# Patient Record
Sex: MALE | Race: ASIAN | NOT HISPANIC OR LATINO
[De-identification: names, ages, dates, MRNs, and addresses within clinical notes are randomized per-mention and may not be internally consistent; named-entity substitution may affect disease eponyms.]

---

## 2018-12-10 ENCOUNTER — APPOINTMENT (OUTPATIENT)
Dept: UROLOGY | Facility: CLINIC | Age: 43
End: 2018-12-10
Payer: COMMERCIAL

## 2018-12-10 VITALS
BODY MASS INDEX: 26.01 KG/M2 | WEIGHT: 192 LBS | HEART RATE: 67 BPM | RESPIRATION RATE: 17 BRPM | TEMPERATURE: 98.2 F | HEIGHT: 72 IN | DIASTOLIC BLOOD PRESSURE: 80 MMHG | SYSTOLIC BLOOD PRESSURE: 131 MMHG

## 2018-12-10 DIAGNOSIS — N52.9 MALE ERECTILE DYSFUNCTION, UNSPECIFIED: ICD-10-CM

## 2018-12-10 PROBLEM — Z00.00 ENCOUNTER FOR PREVENTIVE HEALTH EXAMINATION: Status: ACTIVE | Noted: 2018-12-10

## 2018-12-10 PROCEDURE — 99203 OFFICE O/P NEW LOW 30 MIN: CPT

## 2018-12-10 RX ORDER — SILDENAFIL 100 MG/1
100 TABLET, FILM COATED ORAL
Qty: 6 | Refills: 5 | Status: ACTIVE | COMMUNITY
Start: 2018-12-10 | End: 1900-01-01

## 2020-12-04 ENCOUNTER — APPOINTMENT (OUTPATIENT)
Dept: ORTHOPEDIC SURGERY | Facility: CLINIC | Age: 45
End: 2020-12-04
Payer: MEDICAID

## 2020-12-04 VITALS — BODY MASS INDEX: 27.36 KG/M2 | HEIGHT: 72 IN | WEIGHT: 202 LBS

## 2020-12-04 DIAGNOSIS — Z86.79 PERSONAL HISTORY OF OTHER DISEASES OF THE CIRCULATORY SYSTEM: ICD-10-CM

## 2020-12-04 DIAGNOSIS — M75.102 UNSPECIFIED ROTATOR CUFF TEAR OR RUPTURE OF LEFT SHOULDER, NOT SPECIFIED AS TRAUMATIC: ICD-10-CM

## 2020-12-04 DIAGNOSIS — Z78.9 OTHER SPECIFIED HEALTH STATUS: ICD-10-CM

## 2020-12-04 DIAGNOSIS — Z87.891 PERSONAL HISTORY OF NICOTINE DEPENDENCE: ICD-10-CM

## 2020-12-04 DIAGNOSIS — Z82.61 FAMILY HISTORY OF ARTHRITIS: ICD-10-CM

## 2020-12-04 DIAGNOSIS — Z86.69 PERSONAL HISTORY OF OTHER DISEASES OF THE NERVOUS SYSTEM AND SENSE ORGANS: ICD-10-CM

## 2020-12-04 DIAGNOSIS — Z80.9 FAMILY HISTORY OF MALIGNANT NEOPLASM, UNSPECIFIED: ICD-10-CM

## 2020-12-04 DIAGNOSIS — Z86.39 PERSONAL HISTORY OF OTHER ENDOCRINE, NUTRITIONAL AND METABOLIC DISEASE: ICD-10-CM

## 2020-12-04 DIAGNOSIS — M54.12 RADICULOPATHY, CERVICAL REGION: ICD-10-CM

## 2020-12-04 PROCEDURE — 99072 ADDL SUPL MATRL&STAF TM PHE: CPT

## 2020-12-04 PROCEDURE — 72040 X-RAY EXAM NECK SPINE 2-3 VW: CPT

## 2020-12-04 PROCEDURE — 73030 X-RAY EXAM OF SHOULDER: CPT | Mod: LT

## 2020-12-04 PROCEDURE — 99204 OFFICE O/P NEW MOD 45 MIN: CPT

## 2020-12-04 RX ORDER — DICLOFENAC SODIUM 50 MG/1
50 TABLET, DELAYED RELEASE ORAL TWICE DAILY
Qty: 60 | Refills: 0 | Status: ACTIVE | COMMUNITY
Start: 2020-12-04 | End: 1900-01-01

## 2020-12-04 NOTE — DISCUSSION/SUMMARY
[de-identified] : Left shoulder rotator cuff syndrome\par Cervical neural foraminal stenosis with possible compression of the exiting nerve roots C6-C7\par \par I discussed my findings on history, exam and radiology.\par \par I reviewed the anatomy and function of the shoulder rotator cuff muscles and tendons, biceps tendon and labrum. Given the current findings for the patient, I recommend proceeding with non-operative management of the shoulder consisting of the following:\par \par Patient education about the shoulder motions causing pain and possibly injury for activity modification\par \par Ice or warm compress\par \par Physical therapy prescription with shoulder rotator cuff strengthening, jono-scapular stabilization, ROM stretching, mobilization, modalities, HEP\par \par The patient was prescribed Diclofenac PO non-steroidal anti-inflammatory medication. 50mg tablets twice daily to be taken for at least 1-2 weeks in a row and then PRN afterwards. Risks and benefits were discussed and include but not limited to renal damage and GI ulceration and bleeding.  They were advised to take with food to limit stomach upset as well as warned to stop the medication if worsening gastric pain or dizziness or other side effects. Also to immediately stop the medication and seek appropriate medical attention if any severe stomach ache, gastritis, black/red vomit, black/red stools or any other medical concern.\par \par declining emg/ncv mri\par \par The patient verifies their understanding the the visit, diagnosis and plan. They agree with the treatment plan and will contact the office with any questions or problems.\par \par FU after PT completion if unimproved

## 2020-12-04 NOTE — PHYSICAL EXAM
[de-identified] : Physical Examination\par General: well nourished, in no acute distress, alert and oriented to person, place and time\par Psychiatric: normal mood and affect, no abnormal movements or speech patterns\par Eyes: vision intact + glasses\par Throat: no thyromegaly\par Lymph: no enlarged nodes, no lymphedema in extremity\par Respiratory: no wheezing, no shortness of breath with ambulation\par Cardiac: no cardiac leg swelling, 2+ peripheral pulses\par Neurology: normal gross sensation in extremities to light touch\par Abdomen: soft, non-tender, tympanic, no masses\par \par Musculoskeletal Examination\par Cervical spine	Full painless range of motion and mild positive left Spurling's test to left ulnar nerve\par \par Shoulder			Right			Left\par Appearance\par      Skin/Swelling/Deformity	normal			normal\par      Scapular Winging		-			-\par Range of Motion\par      Forward Flexion		170 / 170		170 / 170\par      Abduction			170 / 170		170 / 170\par      External Rotation		45			45\par      Internal Rotation		T10			T10\par      SAbd Ext Rotation		90			90\par      SAbd Int Rotation		80			80\par      Painful Arc			-			+\par      Crepitus			-			-\par Palpation\par      Clavicle			-			-\par      AC Joint			-			-\par      Posterior Acromion		-			-\par      Levator Scapula		-			-\par      Lateral Bursa			-			+\par      Impingement Area		-			-\par      Biceps Tendon		-			-\par      Anterior Capsule		-			-\par Strength Examination\par      Supraspinatous 		5+ / 0			5+ / 0\par      Infraspinatous			5+ / 0			5+ / 0\par      Subscapularis			5+ / 0			5+ / 0\par      Belly Press			5+ / 0			5+ / 0\par      Lift Off			-			-\par      Drop-Arm			-			-\par Special Examination\par      Biceps Haddonfield's		-			-\par      Impingement Neer		-			-\par      Impingement Hawking		-			-\par \par      Apprehension			-			-\par           Suppression Appre		-			-\par      Anterior Subluxation		-			-\par      Posterior Subluxation		-			-\par      AC Cross-Body\par           Anterior			-			-\par           Posterior			-			-\par \par Sensation\par      Axillary			normal			normal\par      LatAntCubBrach 		normal			normal\par      Median 			normal			normal\par      Ulnar 			normal			mild subj decrease\par      Radial 			normal			normal\par Motor\par      AIN 				normal			normal\par      Ulnar 			normal			normal\par      Radial 			normal			normal\par      PIN 				normal			normal\par Pulses\par      Radial			2+			2+ [de-identified] : 4 views of the affected Right shoulder (AP, Glenoid, Y-View, Axillary)\par And 2 views of the cervical spine\par were ordered, obtained and evaluated by myself today and\par demonstrate:\par normal bony calcification without dislocation and no fracture\par 	Arch	2B\par 	AC Joint	no Arthrosis\par 	GH Joint	no Arthrosis\par 	Calcifications	none\par \par Normal lordosis with maintained 3 column alignment with evidence of decreased cervical disc space and osteophytic changes at the C6-C7 level

## 2020-12-04 NOTE — HISTORY OF PRESENT ILLNESS
[de-identified] : CC  LEFT shoulder\par  \par HPI 45 year yo M right HD presents with chronic onset of acute on chronic, 5yrs on 3months, of intermittent pain in the lateral left shoulder without injury. The pain is same and rated a 2 out of 10, described as dull ache with radiation to elbow. Rest makes the pain better and golf makes the pain worse. The patient reports associated symptoms of numbess in the left pinky. The patient reports pain at night affecting sleep, denies neck pain, but reports neck stiffness, and reports similar pain previously.\par  \par The patient has tried the following treatments:\par Activity modification	+\par Ice			-\par Nsaids    		-\par Physical Therapy  	-\par Cortisone Injection	-\par Arthroscopy/Surgery	-\par \par  \par Review of Systems is positive for the above musculoskeletal symptoms and is otherwise non-contributory for general, constitutional, psychiatric, neurologic, HEENT, cardiac, respiratory, gastrointestinal, reproductive, lymphatic, and dermatologic complaints.\par  \par Consult by Carol

## 2022-08-24 ENCOUNTER — NON-APPOINTMENT (OUTPATIENT)
Age: 47
End: 2022-08-24

## 2022-10-03 ENCOUNTER — APPOINTMENT (OUTPATIENT)
Dept: ENDOCRINOLOGY | Facility: CLINIC | Age: 47
End: 2022-10-03

## 2022-10-03 VITALS
DIASTOLIC BLOOD PRESSURE: 92 MMHG | HEART RATE: 73 BPM | WEIGHT: 190 LBS | BODY MASS INDEX: 25.73 KG/M2 | OXYGEN SATURATION: 99 % | TEMPERATURE: 97.4 F | HEIGHT: 72 IN | SYSTOLIC BLOOD PRESSURE: 168 MMHG

## 2022-10-03 LAB
GLUCOSE BLDC GLUCOMTR-MCNC: 249
HBA1C MFR BLD HPLC: 9.6

## 2022-10-03 PROCEDURE — 99205 OFFICE O/P NEW HI 60 MIN: CPT | Mod: 25

## 2022-10-03 PROCEDURE — 82962 GLUCOSE BLOOD TEST: CPT

## 2022-10-03 NOTE — HISTORY OF PRESENT ILLNESS
[FreeTextEntry1] : CHIEF COMPLAINT: Type 2 diabetes, decreased libido\par \par REFERRED BY: Dr. Jacinto Olmedo\par \par Available prior medical records reviewed. \par \par HISTORY OF PRESENTING ILLNESS:\par The patient is a 47-year-old male being seen today for evaluation of type 2 diabetes, decreased libido.  He reports that he was diagnosed with diabetes in , started on metformin which she took for about 2 to 3 years.  He has stopped metformin since , and has not followed up with any doctors since then.  A1c in office today is 9.6%, 10/3/2022.  In terms of symptoms, he reports mild polyuria and polydipsia a couple months ago, which is resolved now.  Reports normal bowel movements, normal appetite.\par \par DIABETES HPI: \par Type of Diabetes: 2\par Duration of Diabetes: Diagnosed in \par \par A1c: 9.6% today, 10/3/2022\par \par Current home regimen: None, previously was on metformin until \par He is currently taking no medications\par \par Diabetes complications: He does not have any known complications of diabetes\par \par Last retinopathy screening: Due, last visit was many years ago\par Last nephropathy screening (urine ACR): Due, has not been to a doctor in a few years\par Last foot exam/podiatry visit: Due\par \par BG self monitoring: Fingersticks very occasionally, less than once a month\par Denies any hypoglycemia.  Reports that his morning blood sugars can be 130-190, and postmeal sugars can be up to 300s.  This is based on very infrequent checking.\par \par Diet: He reports that he has in a stage and diet, however has cut down on rice.  He reports that he eats a lot of veggies and meat, mostly beef.  He drinks black coffee without sugar, no sodas.\par Exercise: Minimal.  He plays golf once a week.\par Steroid intake: None\par \par Comorbidities: HTN, HLD\par Reports that he used to be on a statin and on blood pressure medications in the past, but does not remember.\par \par \par HYPOGONADISM HPI:\par Symptoms present since: He complains of decreased libido and erectile dysfunction since at least 2018\par Formal diagnosis of hypogonadism established: Never, testosterone has not been tested previously\par \par Symptoms: Tiredness, weakness, insomnia and daytime napping.  He is able to have erections, but finds it difficult to maintain erections, needs to concentrate more.  Ejaculation is okay.  He rarely has early morning erections.  He is not taking Viagra or Cialis currently.  He also endorses some postural lightheadedness.\par Decreased libido: Yes\par Erectile dysfunction: Yes\par Loss of muscle mass: He reports that he used to be a bigger built before\par Decreased shaving frequency: No change\par Decreased AM erections: Yes, he rarely has early a.m. erections\par Gynecomastia: No\par Anosmia: No\par History of fractures: No\par Fatigue: Yes\par \par Contributing factors: \par PEG: He reports that he snores at night, no formal sleep study\par Obesity: No\par Chronic pain: No\par Opiates: No\par Psychiatric diagnoses: No\par Psych medications: No\par Head injury: No\par Mumps: No\par Testicular injury: No\par Anabolic steroid use: No\par \par BPH Symptoms: He reports that he used to have polyuria and polydipsia a few months ago, but that is resolved now.\par \par Social factors: He is sexually active with his wife.  They have been  since .  He has 1 biological child, 6 years old.\par Social history: He drinks socially, 3 drinks a week.  He quit smoking in .  He owns a liquor store.\par Family history: Mother with diabetes, hypertension.  Father with diabetes, stroke, hypertension ().\par \par PERTINENT LABS: \par A1c 9.6%, today, 10/3/2022\par \par \par \par

## 2022-10-03 NOTE — ASSESSMENT
[FreeTextEntry1] : 1. Diabetes Mellitus \par Type: 2\par A1c: 9.6%, today, 10/3/2022\par Current Regimen: None\par Goal A1c: Less than 7%\par \par PLAN: \par We had a detailed discussion about his goal A1c, which would be less than 7%.  We discussed that starting a single agent such as metformin will not bring him down to his goal.  We should be aiming to start dual therapy in addition to lifestyle modifications.  We will check a CMP today, and if it looks normal, we can start the following regimen:\par \par - Regimen: (If normal CMP)\par      Metformin 1000 mg twice daily\par      Jardiance 10 mg daily\par \par We discussed benefits, side effects and risks of SGLT2-inhibitors including but not limited to risk of dehydration (increased thirst and polyuria), genital mycotic infections, rare risk of Kristen's gangrene and also of euglycemic DKA. Patient verbalized understanding of information.\par \par - Labs: CMP, urine ACR, fasting lipid profile, TSH, vitamin B12, 25 OH vitamin D, total and free testosterone, SHBG\par \par - Preventive: \par        Nephropathy screening: Due, check urine ACR\par        Retinopathy screening: Due, asked to make appointment\par        Foot exam/podiatrist: Due, will check at next visit\par        Diabetes education: Asked him to make an appointment with RD/CDE\par \par - Counseling: We discussed diabetes foot care, long term complications of diabetes including but not limited to neuropathy, nephropathy, retinopathy and cardiovascular disease and the benefits of good glycemic control in preventing said complications. We discussed the risks and benefits of diabetes medications and/or insulin as relevant for today, prevention and management of hypoglycemia, importance of medication compliance and blood glucose monitoring.\par \par 2. Hypertension\par BP goal <130/90\par Current medications: None\par He reports that he checks blood pressure at home, and is usually 160/100\par -If CMP shows normal renal function, will start lisinopril 5 mg daily\par \par 3. Hyperlipidemia\par LDL goal <100\par He should be on at least a moderate intensity statin given that he has type 2 diabetes and age 40 to 75 years.\par - We will start atorvastatin 20 mg daily after he completes labs\par \par 4.  Decreased libido\par –Optimize glycemic control as this should help with erectile dysfunction\par – Check total and free testosterone, fasting at 8 AM, check SHBG\par – Encourage lifestyle modifications such as improving sleep hygiene, dietary modification and increasing physical exercise\par –If work-up is normal, will offer Viagra or Cialis for erectile dysfunction\par \par Return to clinic in 3 months. \par \par Carie Aaron MD\par [facility]\par Endocrinology at [place] \par [address], [address]\par Ph: [phone]\par Fax: [phone]\par \par

## 2022-10-04 ENCOUNTER — TRANSCRIPTION ENCOUNTER (OUTPATIENT)
Age: 47
End: 2022-10-04

## 2022-10-05 LAB
25(OH)D3 SERPL-MCNC: 14.6 NG/ML
ALBUMIN SERPL ELPH-MCNC: 4.9 G/DL
ALP BLD-CCNC: 117 U/L
ALT SERPL-CCNC: 216 U/L
ANION GAP SERPL CALC-SCNC: 12 MMOL/L
AST SERPL-CCNC: 102 U/L
BILIRUB SERPL-MCNC: 0.6 MG/DL
BUN SERPL-MCNC: 19 MG/DL
CALCIUM SERPL-MCNC: 10 MG/DL
CHLORIDE SERPL-SCNC: 99 MMOL/L
CHOLEST SERPL-MCNC: 292 MG/DL
CO2 SERPL-SCNC: 28 MMOL/L
CREAT SERPL-MCNC: 0.95 MG/DL
CREAT SPEC-SCNC: 52 MG/DL
EGFR: 99 ML/MIN/1.73M2
GLUCOSE SERPL-MCNC: 175 MG/DL
HDLC SERPL-MCNC: 71 MG/DL
LDLC SERPL CALC-MCNC: 183 MG/DL
MICROALBUMIN 24H UR DL<=1MG/L-MCNC: 6.7 MG/DL
MICROALBUMIN/CREAT 24H UR-RTO: 128 MG/G
NONHDLC SERPL-MCNC: 221 MG/DL
POTASSIUM SERPL-SCNC: 4.4 MMOL/L
PROT SERPL-MCNC: 7.9 G/DL
SHBG SERPL-SCNC: 57.4 NMOL/L
SODIUM SERPL-SCNC: 139 MMOL/L
TRIGL SERPL-MCNC: 189 MG/DL
TSH SERPL-ACNC: 2.09 UIU/ML
VIT B12 SERPL-MCNC: 1218 PG/ML

## 2022-10-07 LAB
TESTOST FREE SERPL-MCNC: 9.3 PG/ML
TESTOST SERPL-MCNC: 697 NG/DL

## 2022-10-21 ENCOUNTER — APPOINTMENT (OUTPATIENT)
Dept: CARDIOLOGY | Facility: CLINIC | Age: 47
End: 2022-10-21

## 2022-10-21 VITALS
RESPIRATION RATE: 17 BRPM | SYSTOLIC BLOOD PRESSURE: 161 MMHG | OXYGEN SATURATION: 100 % | TEMPERATURE: 97.8 F | DIASTOLIC BLOOD PRESSURE: 98 MMHG | WEIGHT: 192 LBS | HEART RATE: 83 BPM | BODY MASS INDEX: 26.04 KG/M2

## 2022-10-21 DIAGNOSIS — Z82.49 FAMILY HISTORY OF ISCHEMIC HEART DISEASE AND OTHER DISEASES OF THE CIRCULATORY SYSTEM: ICD-10-CM

## 2022-10-21 DIAGNOSIS — Z78.9 OTHER SPECIFIED HEALTH STATUS: ICD-10-CM

## 2022-10-21 DIAGNOSIS — Z83.3 FAMILY HISTORY OF DIABETES MELLITUS: ICD-10-CM

## 2022-10-21 DIAGNOSIS — Z82.3 FAMILY HISTORY OF STROKE: ICD-10-CM

## 2022-10-21 PROCEDURE — 99203 OFFICE O/P NEW LOW 30 MIN: CPT

## 2022-10-31 PROBLEM — Z83.3 FAMILY HISTORY OF DIABETES MELLITUS: Status: ACTIVE | Noted: 2022-10-31

## 2022-10-31 PROBLEM — Z82.3 FAMILY HISTORY OF CEREBROVASCULAR ACCIDENT (CVA): Status: ACTIVE | Noted: 2022-10-31

## 2022-10-31 PROBLEM — Z82.49 FAMILY HISTORY OF HYPERTENSION: Status: ACTIVE | Noted: 2022-10-31

## 2022-10-31 PROBLEM — Z78.9 NON-SMOKER: Status: ACTIVE | Noted: 2022-10-31

## 2022-10-31 NOTE — HISTORY OF PRESENT ILLNESS
[FreeTextEntry1] : 47 year old male w. PMH of HTN, HLD, and DM presents to establish care. Pt hasn't seen PCP since 2018. He started Lisinopril 5 mg 3 weeks. His LDL on 10/13/22 was 163, but AST 57, . His A1C was 8.7. He denies CP/SOB/palpitations/dizziness/HA. Pt denies h/o syncope.\par

## 2022-12-12 ENCOUNTER — APPOINTMENT (OUTPATIENT)
Dept: ENDOCRINOLOGY | Facility: CLINIC | Age: 47
End: 2022-12-12

## 2022-12-12 ENCOUNTER — RESULT CHARGE (OUTPATIENT)
Age: 47
End: 2022-12-12

## 2022-12-12 VITALS — BODY MASS INDEX: 26.01 KG/M2 | WEIGHT: 192 LBS | HEIGHT: 72 IN

## 2022-12-12 LAB — GLUCOSE BLDC GLUCOMTR-MCNC: 129

## 2022-12-12 PROCEDURE — G0108 DIAB MANAGE TRN  PER INDIV: CPT

## 2023-01-09 ENCOUNTER — APPOINTMENT (OUTPATIENT)
Dept: ENDOCRINOLOGY | Facility: CLINIC | Age: 48
End: 2023-01-09
Payer: COMMERCIAL

## 2023-01-09 VITALS
DIASTOLIC BLOOD PRESSURE: 86 MMHG | SYSTOLIC BLOOD PRESSURE: 138 MMHG | HEIGHT: 72 IN | WEIGHT: 188 LBS | OXYGEN SATURATION: 97 % | HEART RATE: 69 BPM | BODY MASS INDEX: 25.47 KG/M2 | RESPIRATION RATE: 16 BRPM

## 2023-01-09 DIAGNOSIS — R68.82 DECREASED LIBIDO: ICD-10-CM

## 2023-01-09 LAB — HBA1C MFR BLD HPLC: 7.4

## 2023-01-09 PROCEDURE — 83036 HEMOGLOBIN GLYCOSYLATED A1C: CPT | Mod: QW

## 2023-01-09 PROCEDURE — 99215 OFFICE O/P EST HI 40 MIN: CPT | Mod: 25

## 2023-01-09 NOTE — HISTORY OF PRESENT ILLNESS
[FreeTextEntry1] : CHIEF COMPLAINT: Type 2 diabetes, decreased libido\par \par REFERRED BY: Dr. Jacinto Olmedo\par \par Available prior medical records reviewed. \par \par HISTORY OF PRESENTING ILLNESS:\par The patient is a 47-year-old male being seen today for evaluation of type 2 diabetes, decreased libido.  He was diagnosed with diabetes in , started on metformin which she took for about 2 to 3 years.  He has stopped metformin since , and did not follow up with any doctors until 10/2022 when he saw me.  A1c 10/3/22 9.6%. Reports normal bowel movements, normal appetite. Lost 5 lbs since LV. \par \par DIABETES HPI: \par Type of Diabetes: 2\par Duration of Diabetes: Diagnosed in \par \par A1c: 7.4% 23, 9.6% 10/3/2022\par \par Current home regimen: Metformin 1000 mg daily, Jardiance 10 mg daily (started 10/3/22)\par He mostly only takes Metformin 1000 once daily, as he is usually out in the evenings so doesn't take BID. \par Previously was on metformin until .\par \par Diabetes complications: He does not have any known complications of diabetes\par \par Last retinopathy screening: Due, last visit was many years ago\par Last nephropathy screening (urine ACR): +ve 128 in 10/2022\par Last foot exam/podiatry visit: Normal foot exam 23\par \par BG self monitoring: Fingersticks around once a week\par Denies any hypoglycemia.  -200s. Fasting usually low 100s, but can be up to 200 post prandial sometimes. \par \par Diet: Has cut down on rice.  He reports that he eats a lot of veggies and meat, mostly beef.  He drinks black coffee without sugar, no sodas.\par Exercise: Minimal.  He plays golf once a week.\par Steroid intake: None\par \par Comorbidities: \par HTN: taking lisinopril 15 mg daily now after seeing cardiologist\par HLD: not on statin.  We did not start statin because of elevated LFTs at last visit.  He reports that he saw his PCP who got a liver ultrasound, which showed fatty liver.  LFTs somewhat improved 10/13/2020 2 repeat check.\par \par \par HYPOGONADISM HPI:\par Symptoms present since: He complains of decreased libido and erectile dysfunction since at least 2018\par Normal testosterone 10/2022\par Symptoms: Tiredness, weakness, insomnia and daytime napping.  He is able to have erections, but finds it difficult to maintain erections, needs to concentrate more.  Ejaculation is okay.  He rarely has early morning erections.  He is not taking Viagra or Cialis currently.  He also endorses some postural lightheadedness.\par PEG: He reports that he snores at night, no formal sleep study\par \par Social factors: He is sexually active with his wife.  They have been  since .  He has 1 biological child, 6 years old.\par Social history: He drinks socially, 3 drinks a week.  He quit smoking in .  He owns a liquor store.\par Family history: Mother with diabetes, hypertension.  Father with diabetes, stroke, hypertension ().\par \par PERTINENT LABS: \par \par A1c 7.4% 2023\par \par 10/13/2022\par \par eGFR 94\par A1c 8.7%\par AST 57\par \par \par 10/3/2022\par A1c 9.6%\par Urine \par Total testosterone 697\par 25 OH vitamin D 14.6\par Vitamin B12 1218\par TSH 2.09\par Triglyceride 189\par Total cholesterol 292\par \par HDL 71\par eGFR 99\par \par \par \par \par \par

## 2023-01-09 NOTE — ASSESSMENT
[FreeTextEntry1] : 1. Diabetes Mellitus \par Type: 2\par A1c: 7.4% 1/9/23\par Current Regimen: Metformin 1000 mg daily, Jardiance 10 mg daily\par Goal A1c: Less than 7%\par \par PLAN: \par \par - Regimen:\par      Continue metformin 1000 mg twice daily, he has trouble taking it twice daily\par      Increase Jardiance to 25 mg daily\par \par We discussed benefits, side effects and risks of SGLT2-inhibitors including but not limited to risk of dehydration (increased thirst and polyuria), genital mycotic infections, rare risk of Kristen's gangrene and also of euglycemic DKA. Patient verbalized understanding of information.\par \par - Labs: CMP, urine ACR, fasting lipid profile, 25 OH vitamin D\par \par - Preventive: \par        Nephropathy screening: 10/2022, 128\par        Retinopathy screening: Due, asked to make appointment\par        Foot exam/podiatrist: Normal foot exam 1/9/2023\par        Diabetes education: Completed 12/2022\par \par - Counseling: We discussed diabetes foot care, long term complications of diabetes including but not limited to neuropathy, nephropathy, retinopathy and cardiovascular disease and the benefits of good glycemic control in preventing said complications. We discussed the risks and benefits of diabetes medications and/or insulin as relevant for today, prevention and management of hypoglycemia, importance of medication compliance and blood glucose monitoring.\par \par 2. Hypertension\par BP goal <130/90\par Current medications: Lisinopril 15 mg daily\par –Continue current medications and titration with cardiologist/PCP\par – Reporting some cough with lisinopril.  We discussed that we can try an ARB in the future if cough is very bothersome.\par \par 3. Hyperlipidemia\par LDL goal <100\par He should be on at least a moderate intensity statin given that he has type 2 diabetes and age 40 to 75 years.\par -Repeat CMP.  If LFTs improving/normal, will start low-dose statin.\par – He reports that liver ultrasound done with PCP showed fatty liver, records not available.\par \par 4.  Decreased libido\par Normal total testosterone from 10/2022.  Patient reports to me that symptoms are not very bothersome at this time.  We discussed taking Viagra or Cialis, but patient does not want it at this time.\par \par 5.  Vitamin D deficiency\par 25 OH vitamin D 14.6 from 10/2022.  He took vitamin D supplements for 1 month.\par – Repeat 25 OH vitamin D level\par \par Return to clinic in 4 months with NP, 8 months with me\par \par Carie Aaron MD\par [facility]\par Endocrinology at [place] \par [address], [address]\par Ph: [phone]\par Fax: [phone]\par \par

## 2023-01-10 ENCOUNTER — NON-APPOINTMENT (OUTPATIENT)
Age: 48
End: 2023-01-10

## 2023-01-10 LAB
25(OH)D3 SERPL-MCNC: 19.7 NG/ML
ALBUMIN SERPL ELPH-MCNC: 4.7 G/DL
ALP BLD-CCNC: 102 U/L
ALT SERPL-CCNC: 300 U/L
ANION GAP SERPL CALC-SCNC: 14 MMOL/L
AST SERPL-CCNC: 146 U/L
BILIRUB SERPL-MCNC: 0.7 MG/DL
BUN SERPL-MCNC: 23 MG/DL
CALCIUM SERPL-MCNC: 9.8 MG/DL
CHLORIDE SERPL-SCNC: 98 MMOL/L
CHOLEST SERPL-MCNC: 313 MG/DL
CO2 SERPL-SCNC: 27 MMOL/L
CREAT SERPL-MCNC: 1.04 MG/DL
CREAT SPEC-SCNC: 97 MG/DL
EGFR: 89 ML/MIN/1.73M2
GLUCOSE SERPL-MCNC: 139 MG/DL
HDLC SERPL-MCNC: 85 MG/DL
LDLC SERPL CALC-MCNC: 194 MG/DL
MICROALBUMIN 24H UR DL<=1MG/L-MCNC: 4.5 MG/DL
MICROALBUMIN/CREAT 24H UR-RTO: 46 MG/G
NONHDLC SERPL-MCNC: 228 MG/DL
POTASSIUM SERPL-SCNC: 4.1 MMOL/L
PROT SERPL-MCNC: 8.3 G/DL
SODIUM SERPL-SCNC: 138 MMOL/L
TRIGL SERPL-MCNC: 167 MG/DL

## 2023-05-09 ENCOUNTER — APPOINTMENT (OUTPATIENT)
Dept: ENDOCRINOLOGY | Facility: CLINIC | Age: 48
End: 2023-05-09

## 2023-08-10 ENCOUNTER — NON-APPOINTMENT (OUTPATIENT)
Age: 48
End: 2023-08-10

## 2023-08-13 ENCOUNTER — TRANSCRIPTION ENCOUNTER (OUTPATIENT)
Age: 48
End: 2023-08-13

## 2023-08-17 ENCOUNTER — APPOINTMENT (OUTPATIENT)
Dept: ORTHOPEDIC SURGERY | Facility: CLINIC | Age: 48
End: 2023-08-17
Payer: COMMERCIAL

## 2023-08-17 ENCOUNTER — NON-APPOINTMENT (OUTPATIENT)
Age: 48
End: 2023-08-17

## 2023-08-17 VITALS — WEIGHT: 188 LBS | HEIGHT: 72 IN | BODY MASS INDEX: 25.47 KG/M2

## 2023-08-17 DIAGNOSIS — M25.571 PAIN IN RIGHT ANKLE AND JOINTS OF RIGHT FOOT: ICD-10-CM

## 2023-08-17 DIAGNOSIS — M25.572 PAIN IN LEFT ANKLE AND JOINTS OF LEFT FOOT: ICD-10-CM

## 2023-08-17 PROCEDURE — 99213 OFFICE O/P EST LOW 20 MIN: CPT

## 2023-08-17 PROCEDURE — 73610 X-RAY EXAM OF ANKLE: CPT | Mod: LT

## 2023-08-17 NOTE — DISCUSSION/SUMMARY
[de-identified] : 46 y/o male with bilateral ankle pain.   The patient presents with pain to the bilateral ankles. Presentation is consistent with early degenerative change to the lateral ankle from prior instability events. Described that this is likely due to overuse, and age-related "wear and tear". Pain likely related to breakdown of the lateral ligamentous complex with some residual instability.  Recommendation: Conservative care & observation, NSAIDS, ice prn. Bracing as needed.   Follow-up as needed.

## 2023-08-17 NOTE — HISTORY OF PRESENT ILLNESS
[de-identified] : 47 year old male presents today with chronic bilateral ankle pain. R>L. Recalls injuring his ankles in high school and both ankles feel unstable. The pain is present with weather changes. Localilzes pain to the lateral ankle. He is not taking pain medication. He is here for check up. Denies numbness or tingling.    The patient's past medical history, past surgical history, medications and allergies were reviewed by me today with the patient and documented accordingly. In addition, the patient's family and social history, which were noncontributory to this visit, were reviewed also.

## 2023-08-17 NOTE — PHYSICAL EXAM
[de-identified] : Oriented to time, place, person Mood: Normal Affect: Normal Appearance: Healthy, well appearing, no acute distress. Gait: Normal Assistive Devices: None  Right Ankle exam:  Skin: Clean, dry, intact Inspection: No obvious malalignment, no swelling, no effusion Pulses: 2+ DP/PT pulses  ROM: normal degrees of dorsiflexion, normal degrees of plantarflexion, normal subtalar motion. Tenderness: No tenderness over the lateral malleolus, no CFL/ATFL/PTFL pain. No medial malleolus pain, no deltoid ligament pain. No proximal fibular pain. No heel pain. Stability: Negative anterior drawer, negative posterior drawer. Mild valgus laxity to the ankle.  Strength: 5/5 TA/GS/EHL 5/5 inversion/eversion Neuro: In tact to light touch throughout Additional tests: Negative syndesmosis squeeze test.  Left Ankle exam:  Skin: Clean, dry, intact Inspection: No obvious malalignment, no swelling, no effusion Pulses: 2+ DP/PT pulses  ROM: normal degrees of dorsiflexion, normal degrees of plantarflexion, normal subtalar motion. Tenderness: No tenderness over the lateral malleolus, no CFL/ATFL/PTFL pain. No medial malleolus pain, no deltoid ligament pain. No proximal fibular pain. No heel pain. Stability: Negative anterior drawer, negative posterior drawer. Strength: 5/5 TA/GS/EHL 5/5 inversion/eversion Neuro: In tact to light touch throughout Additional tests: Negative syndesmosis squeeze test. [de-identified] : The following radiographs were ordered and read by me during this patients visit. I reviewed each radiograph in detail with the patient and discussed the findings as highlighted below.   3 views of the bilateral ankle were obtained today, 08/17/2023, that show no acute fracture or dislocation. There is mild degenerative change seen at the lateral ankle bilaterally. There is no gross malalignment. Ankle mortise is intact.

## 2023-08-17 NOTE — ADDENDUM
[FreeTextEntry1] : This note was written by Digna Hernandez on 08/17/2023 acting solely as a scribe for Dr. Charlie Rios.  All medical record entries made by the Scribe were at my, Dr. Charlie Rios, direction and personally dictated by me on 08/17/2023. I have personally reviewed the chart and agree that the record accurately reflects my personal performance of the history, physical exam, assessment and plan.

## 2023-09-25 ENCOUNTER — RX RENEWAL (OUTPATIENT)
Age: 48
End: 2023-09-25

## 2023-09-25 RX ORDER — METFORMIN ER 500 MG 500 MG/1
500 TABLET ORAL
Qty: 360 | Refills: 3 | Status: ACTIVE | COMMUNITY
Start: 2022-10-05 | End: 1900-01-01

## 2023-09-25 RX ORDER — EMPAGLIFLOZIN 25 MG/1
25 TABLET, FILM COATED ORAL
Qty: 90 | Refills: 3 | Status: ACTIVE | COMMUNITY
Start: 2023-09-25 | End: 1900-01-01

## 2023-10-02 ENCOUNTER — APPOINTMENT (OUTPATIENT)
Dept: ENDOCRINOLOGY | Facility: CLINIC | Age: 48
End: 2023-10-02
Payer: COMMERCIAL

## 2023-10-02 VITALS
HEART RATE: 69 BPM | BODY MASS INDEX: 25.47 KG/M2 | HEIGHT: 72 IN | DIASTOLIC BLOOD PRESSURE: 80 MMHG | SYSTOLIC BLOOD PRESSURE: 130 MMHG | WEIGHT: 188 LBS | OXYGEN SATURATION: 98 %

## 2023-10-02 LAB
25(OH)D3 SERPL-MCNC: 18.8 NG/ML
ALBUMIN SERPL ELPH-MCNC: 5.1 G/DL
ALP BLD-CCNC: 69 U/L
ALT SERPL-CCNC: 49 U/L
ANION GAP SERPL CALC-SCNC: 12 MMOL/L
AST SERPL-CCNC: 31 U/L
BILIRUB SERPL-MCNC: 0.8 MG/DL
BUN SERPL-MCNC: 27 MG/DL
CALCIUM SERPL-MCNC: 10.2 MG/DL
CHLORIDE SERPL-SCNC: 101 MMOL/L
CHOLEST SERPL-MCNC: 259 MG/DL
CO2 SERPL-SCNC: 27 MMOL/L
CREAT SERPL-MCNC: 1.11 MG/DL
CREAT SPEC-SCNC: 100 MG/DL
EGFR: 81 ML/MIN/1.73M2
GLUCOSE SERPL-MCNC: 123 MG/DL
HBA1C MFR BLD HPLC: 6.5
HDLC SERPL-MCNC: 85 MG/DL
LDLC SERPL CALC-MCNC: 153 MG/DL
MICROALBUMIN 24H UR DL<=1MG/L-MCNC: 1.6 MG/DL
MICROALBUMIN/CREAT 24H UR-RTO: 16 MG/G
NONHDLC SERPL-MCNC: 174 MG/DL
POTASSIUM SERPL-SCNC: 4.9 MMOL/L
PROT SERPL-MCNC: 8.1 G/DL
SODIUM SERPL-SCNC: 141 MMOL/L
TRIGL SERPL-MCNC: 120 MG/DL
TSH SERPL-ACNC: 1.25 UIU/ML
VIT B12 SERPL-MCNC: 761 PG/ML

## 2023-10-02 PROCEDURE — 99214 OFFICE O/P EST MOD 30 MIN: CPT | Mod: 25

## 2023-10-02 PROCEDURE — 83036 HEMOGLOBIN GLYCOSYLATED A1C: CPT | Mod: QW

## 2023-10-02 RX ORDER — EMPAGLIFLOZIN 25 MG/1
25 TABLET, FILM COATED ORAL
Qty: 90 | Refills: 3 | Status: ACTIVE | COMMUNITY
Start: 2022-10-05 | End: 1900-01-01

## 2023-12-09 ENCOUNTER — RX RENEWAL (OUTPATIENT)
Age: 48
End: 2023-12-09

## 2024-04-11 ENCOUNTER — APPOINTMENT (OUTPATIENT)
Dept: ENDOCRINOLOGY | Facility: CLINIC | Age: 49
End: 2024-04-11
Payer: COMMERCIAL

## 2024-04-11 VITALS
HEART RATE: 80 BPM | OXYGEN SATURATION: 97 % | SYSTOLIC BLOOD PRESSURE: 120 MMHG | DIASTOLIC BLOOD PRESSURE: 80 MMHG | WEIGHT: 188 LBS | HEIGHT: 72 IN | BODY MASS INDEX: 25.47 KG/M2

## 2024-04-11 DIAGNOSIS — I10 ESSENTIAL (PRIMARY) HYPERTENSION: ICD-10-CM

## 2024-04-11 DIAGNOSIS — E11.9 TYPE 2 DIABETES MELLITUS W/OUT COMPLICATIONS: ICD-10-CM

## 2024-04-11 DIAGNOSIS — E55.9 VITAMIN D DEFICIENCY, UNSPECIFIED: ICD-10-CM

## 2024-04-11 DIAGNOSIS — N52.9 MALE ERECTILE DYSFUNCTION, UNSPECIFIED: ICD-10-CM

## 2024-04-11 DIAGNOSIS — E78.5 HYPERLIPIDEMIA, UNSPECIFIED: ICD-10-CM

## 2024-04-11 LAB — HBA1C MFR BLD HPLC: 8

## 2024-04-11 PROCEDURE — 99214 OFFICE O/P EST MOD 30 MIN: CPT

## 2024-04-11 PROCEDURE — 83036 HEMOGLOBIN GLYCOSYLATED A1C: CPT | Mod: QW

## 2024-04-11 PROCEDURE — G2211 COMPLEX E/M VISIT ADD ON: CPT

## 2024-04-11 RX ORDER — SILDENAFIL 50 MG/1
50 TABLET ORAL
Qty: 40 | Refills: 1 | Status: ACTIVE | COMMUNITY
Start: 2024-04-11 | End: 1900-01-01

## 2024-04-11 NOTE — HISTORY OF PRESENT ILLNESS
[FreeTextEntry1] : CHIEF COMPLAINT: Type 2 diabetes  HISTORY OF PRESENTING ILLNESS: The patient is a 48-year-old male being seen today for evaluation of type 2 diabetes.  He was diagnosed with diabetes in , started on metformin which he took for about 2 to 3 years.  He stopped metformin in , and did not follow up with any doctors until 10/2022 when he first saw me.  A1c 10/3/22 9.6%, when we restarted metformin.  DIABETES HPI:  Type of Diabetes: 2 Duration of Diabetes: Diagnosed in   A1c: 8% 2024 -> reports worsening of A1c due to dietary indiscretions in the last few months 6.5% 10/2/2023 7.4% 23, 9.6% 10/3/2022  Current regimen: Metformin 1000 mg daily, Jardiance 25 mg daily Diabetes complications: He does not have any known complications of diabetes.  Last retinopathy screening: Due, last visit was many years ago Last nephropathy screening (urine ACR): +ve 46 in 2023, normalized in 10/2023  BG self monitoring: Not checking Denies any hypoglycemia.    Diet: Trying to cut down on carbs.  Last few months he has been eating poorly.  Exercise: Minimal.  He plays golf once a week.  Comorbidities:  HTN: taking lisinopril 15 mg daily, follows with cardiologist.  Reports that he takes 10 mg and 20 mg on alternate days. HLD: Started rosuvastatin 5 mg daily in 10/2023 Erectile dysfunction: Normal testosterone levels from .  He has normal libido, normal erection but has difficulty maintaining the erections.  Sexually active around twice a week.  Social history: He drinks socially, 3 drinks a week.  He quit smoking in .  He owns a liquor store. Family history: Mother with diabetes, hypertension.  Father with diabetes, stroke, hypertension ().

## 2024-04-11 NOTE — ASSESSMENT
[FreeTextEntry1] : 1. Diabetes Mellitus  Type: 2 A1c: 8% 4/11/24 Current Regimen: Metformin 1000 mg daily, Jardiance 25 mg daily  PLAN:   - Regimen:      Increase metformin to 1000 mg twice daily      Continue Jardiance 25 mg daily We discussed making lifestyle modifications such as dietary modifications and increasing physical exercise.  - Labs: CMP, lipid panel, 25 OH vitamin D  - Preventive:         Nephropathy screening: Negative, 10/2023        Retinopathy screening: Due, asked to make appointment        Foot exam/podiatrist: Normal foot exam 1/9/2023        Diabetes education: Completed 12/2022  2.  Erectile dysfunction Difficulty maintaining erections.  Normal testosterone from 2022.  Normal libido. -Recommend trying Viagra 50 to 100 mg, take 30 to 60 minutes before sexual intercourse.  Side effects discussed.  3. Hyperlipidemia LDL goal <100 Rosuvastatin 5 mg started in 10/2023. -Continue rosuvastatin 5 mg daily, monitor LFTs -Check lipid panel, CMP - He reports that liver ultrasound done with PCP showed fatty liver, records not available.  Mildly elevated LFTs.  4.  Vitamin D deficiency -Continue over-the-counter supplements - Repeat 25 OH vitamin D level  Return to clinic in 4 months.  Carie Aaron MD [facility] at [address], [address] Ph: [phone] Fax: [phone]

## 2024-04-12 LAB
25(OH)D3 SERPL-MCNC: 31.2 NG/ML
ALBUMIN SERPL ELPH-MCNC: 5 G/DL
ALP BLD-CCNC: 72 U/L
ALT SERPL-CCNC: 106 U/L
ANION GAP SERPL CALC-SCNC: 13 MMOL/L
ANION GAP SERPL CALC-SCNC: 13 MMOL/L
AST SERPL-CCNC: 45 U/L
BILIRUB SERPL-MCNC: 0.6 MG/DL
BUN SERPL-MCNC: 26 MG/DL
BUN SERPL-MCNC: 26 MG/DL
CALCIUM SERPL-MCNC: 10 MG/DL
CALCIUM SERPL-MCNC: 10.1 MG/DL
CHLORIDE SERPL-SCNC: 101 MMOL/L
CHLORIDE SERPL-SCNC: 102 MMOL/L
CHOLEST SERPL-MCNC: 179 MG/DL
CO2 SERPL-SCNC: 24 MMOL/L
CO2 SERPL-SCNC: 25 MMOL/L
CREAT SERPL-MCNC: 1.19 MG/DL
CREAT SERPL-MCNC: 1.21 MG/DL
EGFR: 73 ML/MIN/1.73M2
EGFR: 75 ML/MIN/1.73M2
GLUCOSE SERPL-MCNC: 161 MG/DL
GLUCOSE SERPL-MCNC: 163 MG/DL
HDLC SERPL-MCNC: 82 MG/DL
LDLC SERPL CALC-MCNC: 78 MG/DL
NONHDLC SERPL-MCNC: 97 MG/DL
POTASSIUM SERPL-SCNC: 4.5 MMOL/L
POTASSIUM SERPL-SCNC: 4.6 MMOL/L
PROT SERPL-MCNC: 8.5 G/DL
SODIUM SERPL-SCNC: 138 MMOL/L
SODIUM SERPL-SCNC: 139 MMOL/L
TRIGL SERPL-MCNC: 113 MG/DL

## 2024-05-26 ENCOUNTER — RX RENEWAL (OUTPATIENT)
Age: 49
End: 2024-05-26

## 2024-05-26 RX ORDER — LISINOPRIL 10 MG/1
10 TABLET ORAL DAILY
Qty: 90 | Refills: 0 | Status: ACTIVE | COMMUNITY
Start: 2022-10-05 | End: 1900-01-01

## 2024-06-21 ENCOUNTER — RX RENEWAL (OUTPATIENT)
Age: 49
End: 2024-06-21

## 2024-06-21 RX ORDER — ROSUVASTATIN CALCIUM 5 MG/1
5 TABLET, FILM COATED ORAL
Qty: 90 | Refills: 2 | Status: ACTIVE | COMMUNITY
Start: 2023-10-03 | End: 1900-01-01

## 2024-08-22 ENCOUNTER — APPOINTMENT (OUTPATIENT)
Dept: ENDOCRINOLOGY | Facility: CLINIC | Age: 49
End: 2024-08-22
Payer: COMMERCIAL

## 2024-08-22 VITALS
BODY MASS INDEX: 25.33 KG/M2 | HEART RATE: 77 BPM | HEIGHT: 72 IN | SYSTOLIC BLOOD PRESSURE: 120 MMHG | WEIGHT: 187 LBS | DIASTOLIC BLOOD PRESSURE: 82 MMHG | OXYGEN SATURATION: 99 %

## 2024-08-22 DIAGNOSIS — E11.9 TYPE 2 DIABETES MELLITUS W/OUT COMPLICATIONS: ICD-10-CM

## 2024-08-22 DIAGNOSIS — N52.9 MALE ERECTILE DYSFUNCTION, UNSPECIFIED: ICD-10-CM

## 2024-08-22 DIAGNOSIS — E78.5 HYPERLIPIDEMIA, UNSPECIFIED: ICD-10-CM

## 2024-08-22 DIAGNOSIS — R68.82 DECREASED LIBIDO: ICD-10-CM

## 2024-08-22 LAB — HBA1C MFR BLD HPLC: 7.3

## 2024-08-22 PROCEDURE — 99214 OFFICE O/P EST MOD 30 MIN: CPT

## 2024-08-22 PROCEDURE — G2211 COMPLEX E/M VISIT ADD ON: CPT

## 2024-08-22 PROCEDURE — 83036 HEMOGLOBIN GLYCOSYLATED A1C: CPT | Mod: QW

## 2024-08-22 NOTE — HISTORY OF PRESENT ILLNESS
[FreeTextEntry1] : CHIEF COMPLAINT: Type 2 diabetes  HISTORY OF PRESENTING ILLNESS: The patient is a 48-year-old male being seen today for evaluation of type 2 diabetes.  He was diagnosed with diabetes in , started on metformin which he took for about 2 to 3 years.  He stopped metformin in , and did not follow up with any doctors until 10/2022 when he first saw me.  A1c 10/3/22 9.6%, when we restarted metformin.  DIABETES HPI:  Type of Diabetes: 2 Duration of Diabetes: Diagnosed in   A1c: 7.3% 24 8% 2024 -> worsening of A1c due to dietary indiscretions in the last few months 6.5% 10/2/2023 7.4% 23, 9.6% 10/3/2022  Current regimen: Metformin 1000 mg twice daily (skips evening dose around 2x/week), Jardiance 25 mg daily Diabetes complications: He does not have any known complications of diabetes.  Last retinopathy screening: Due, last visit was many years ago Last nephropathy screening (urine ACR): +ve 46 in 2023, normalized in 10/2023  BG self monitoring: Not checking Denies any hypoglycemia.    Diet: Trying to cut down on carbs.  Last few months he has been eating poorly.  Exercise: Minimal.  He plays golf once a week.  Comorbidities:  HTN: taking lisinopril 15 mg daily, follows with cardiologist.  Reports that he takes 10 mg and 20 mg on alternate days. HLD: Started rosuvastatin 5 mg daily in 10/2023 Erectile dysfunction: Normal testosterone levels from .  He has normal libido, normal erection but has difficulty maintaining the erections.  Uses Viagra as needed with benefit.   Social history: He drinks socially, 3 drinks a week.  He quit smoking in .  He owns a liquor store. Family history: Mother with diabetes, hypertension.  Father with diabetes, stroke, hypertension ().

## 2024-08-22 NOTE — ASSESSMENT
[FreeTextEntry1] : 1. Diabetes Mellitus  Type: 2 A1c: 7.3% 8/22/24 Current Regimen: Metformin 1000 mg twice daily (skips evening dose around 2x/week), Jardiance 25 mg daily  PLAN:   - Regimen:      Continue metformin 1000 mg twice daily, encouraged compliance      Continue Jardiance 25 mg daily We discussed making lifestyle modifications such as dietary modifications and increasing physical exercise.  - Labs: CMP, Urine ACR  - Preventive:         Nephropathy screening: Negative, 10/2023        Retinopathy screening: Due, asked to make appointment        Diabetes education: Completed 12/2022  2.  Erectile dysfunction Difficulty maintaining erections.  Normal testosterone from 2022.  Normal libido. -Continue Viagra as needed.  Side effects discussed.  3. Hyperlipidemia LDL goal <100 Rosuvastatin 5 mg started in 10/2023. -Continue rosuvastatin 5 mg daily, monitor LFTs - He reports that liver ultrasound done with PCP showed fatty liver, records not available.  Mildly elevated LFTs.  Return to clinic in 4-6 months.  Carie Aaron MD [facility] at [address], [address] Ph: [phone] Fax: [phone]

## 2024-08-23 LAB
ALBUMIN SERPL ELPH-MCNC: 4.5 G/DL
ALP BLD-CCNC: 75 U/L
ALT SERPL-CCNC: 182 U/L
ANION GAP SERPL CALC-SCNC: 12 MMOL/L
AST SERPL-CCNC: 75 U/L
BILIRUB SERPL-MCNC: 0.8 MG/DL
BUN SERPL-MCNC: 24 MG/DL
CALCIUM SERPL-MCNC: 9.5 MG/DL
CHLORIDE SERPL-SCNC: 101 MMOL/L
CO2 SERPL-SCNC: 26 MMOL/L
CREAT SERPL-MCNC: 1.06 MG/DL
CREAT SPEC-SCNC: 108 MG/DL
EGFR: 86 ML/MIN/1.73M2
GLUCOSE SERPL-MCNC: 114 MG/DL
MICROALBUMIN 24H UR DL<=1MG/L-MCNC: <1.2 MG/DL
MICROALBUMIN/CREAT 24H UR-RTO: NORMAL MG/G
POTASSIUM SERPL-SCNC: 4.9 MMOL/L
PROT SERPL-MCNC: 7.7 G/DL
SODIUM SERPL-SCNC: 139 MMOL/L

## 2024-09-16 ENCOUNTER — RX RENEWAL (OUTPATIENT)
Age: 49
End: 2024-09-16

## 2024-11-07 ENCOUNTER — TRANSCRIPTION ENCOUNTER (OUTPATIENT)
Age: 49
End: 2024-11-07

## 2024-11-15 ENCOUNTER — APPOINTMENT (OUTPATIENT)
Facility: CLINIC | Age: 49
End: 2024-11-15
Payer: COMMERCIAL

## 2024-11-15 VITALS
HEIGHT: 72 IN | RESPIRATION RATE: 16 BRPM | SYSTOLIC BLOOD PRESSURE: 148 MMHG | BODY MASS INDEX: 25.06 KG/M2 | HEART RATE: 71 BPM | OXYGEN SATURATION: 97 % | DIASTOLIC BLOOD PRESSURE: 89 MMHG | WEIGHT: 185 LBS | TEMPERATURE: 97.8 F

## 2024-11-15 DIAGNOSIS — M67.912 UNSPECIFIED DISORDER OF SYNOVIUM AND TENDON, LEFT SHOULDER: ICD-10-CM

## 2024-11-15 PROCEDURE — 99213 OFFICE O/P EST LOW 20 MIN: CPT

## 2024-12-13 ENCOUNTER — RX RENEWAL (OUTPATIENT)
Age: 49
End: 2024-12-13

## 2024-12-19 ENCOUNTER — APPOINTMENT (OUTPATIENT)
Dept: ENDOCRINOLOGY | Facility: CLINIC | Age: 49
End: 2024-12-19
Payer: COMMERCIAL

## 2024-12-19 VITALS
DIASTOLIC BLOOD PRESSURE: 74 MMHG | BODY MASS INDEX: 24.79 KG/M2 | HEART RATE: 68 BPM | SYSTOLIC BLOOD PRESSURE: 120 MMHG | OXYGEN SATURATION: 97 % | WEIGHT: 183 LBS | HEIGHT: 72 IN

## 2024-12-19 DIAGNOSIS — E11.9 TYPE 2 DIABETES MELLITUS W/OUT COMPLICATIONS: ICD-10-CM

## 2024-12-19 DIAGNOSIS — I10 ESSENTIAL (PRIMARY) HYPERTENSION: ICD-10-CM

## 2024-12-19 DIAGNOSIS — E78.5 HYPERLIPIDEMIA, UNSPECIFIED: ICD-10-CM

## 2024-12-19 LAB — HBA1C MFR BLD HPLC: 6.5

## 2024-12-19 PROCEDURE — 83036 HEMOGLOBIN GLYCOSYLATED A1C: CPT | Mod: QW

## 2024-12-19 PROCEDURE — 99214 OFFICE O/P EST MOD 30 MIN: CPT

## 2024-12-19 PROCEDURE — G2211 COMPLEX E/M VISIT ADD ON: CPT | Mod: NC

## 2025-09-02 ENCOUNTER — RX RENEWAL (OUTPATIENT)
Age: 50
End: 2025-09-02